# Patient Record
Sex: FEMALE | Race: OTHER | Employment: FULL TIME
[De-identification: names, ages, dates, MRNs, and addresses within clinical notes are randomized per-mention and may not be internally consistent; named-entity substitution may affect disease eponyms.]

---

## 2021-11-11 RX ORDER — GABAPENTIN 300 MG/1
300 CAPSULE ORAL 3 TIMES DAILY
COMMUNITY

## 2021-11-14 RX ORDER — GABAPENTIN 300 MG/1
300-600 CAPSULE ORAL EVERY EVENING
Qty: 60 CAPSULE | Refills: 0 | OUTPATIENT
Start: 2021-11-14

## 2021-11-17 RX ORDER — GABAPENTIN 300 MG/1
300 CAPSULE ORAL 3 TIMES DAILY
Status: CANCELLED | OUTPATIENT
Start: 2021-11-17

## 2021-11-19 NOTE — TELEPHONE ENCOUNTER
Requested Prescriptions     Pending Prescriptions Disp Refills    gabapentin (NEURONTIN) 300 mg capsule       Sig: Take 1 Capsule by mouth three (3) times daily. Max Daily Amount: 900 mg. Refused Prescriptions Disp Refills    gabapentin (NEURONTIN) 300 mg capsule 60 Capsule 0     Sig: Take 1-2 Capsules by mouth every evening. Max Daily Amount: 600 mg.      Refused By: [facility]     Reason for Refusal: Appt required, please call patient

## 2023-06-01 ENCOUNTER — HOSPITAL ENCOUNTER (OUTPATIENT)
Facility: HOSPITAL | Age: 48
Discharge: HOME | End: 2023-06-01
Payer: COMMERCIAL

## 2023-06-01 DIAGNOSIS — M48.02 CERVICAL SPINAL STENOSIS: ICD-10-CM

## 2023-06-01 DIAGNOSIS — M47.812 CERVICAL SPONDYLOSIS: ICD-10-CM

## 2023-06-01 DIAGNOSIS — M54.12 RADICULOPATHY, CERVICAL: ICD-10-CM

## 2023-06-01 PROCEDURE — 72141 MRI NECK SPINE W/O DYE: CPT
